# Patient Record
Sex: FEMALE | Race: WHITE
[De-identification: names, ages, dates, MRNs, and addresses within clinical notes are randomized per-mention and may not be internally consistent; named-entity substitution may affect disease eponyms.]

---

## 2019-03-30 ENCOUNTER — HOSPITAL ENCOUNTER (EMERGENCY)
Dept: HOSPITAL 53 - M ED | Age: 19
Discharge: HOME | End: 2019-03-30
Payer: COMMERCIAL

## 2019-03-30 VITALS — WEIGHT: 150.31 LBS | BODY MASS INDEX: 24.16 KG/M2 | HEIGHT: 66 IN

## 2019-03-30 VITALS — SYSTOLIC BLOOD PRESSURE: 104 MMHG | DIASTOLIC BLOOD PRESSURE: 56 MMHG

## 2019-03-30 DIAGNOSIS — R10.9: Primary | ICD-10-CM

## 2019-03-30 DIAGNOSIS — Z79.3: ICD-10-CM

## 2019-03-30 DIAGNOSIS — Z87.42: ICD-10-CM

## 2019-03-30 DIAGNOSIS — Z87.891: ICD-10-CM

## 2019-03-30 LAB
ALBUMIN SERPL BCG-MCNC: 3.8 GM/DL (ref 3.2–5.2)
ALT SERPL W P-5'-P-CCNC: 22 U/L (ref 12–78)
B-HCG SERPL QL: NEGATIVE
BASOPHILS # BLD AUTO: 0 10^3/UL (ref 0–0.2)
BASOPHILS NFR BLD AUTO: 0.4 % (ref 0–1)
BILIRUB CONJ SERPL-MCNC: < 0.1 MG/DL (ref 0–0.2)
BILIRUB SERPL-MCNC: 0.2 MG/DL (ref 0.2–1)
BUN SERPL-MCNC: 19 MG/DL (ref 7–18)
CALCIUM SERPL-MCNC: 8.8 MG/DL (ref 8.5–10.1)
CHLORIDE SERPL-SCNC: 108 MEQ/L (ref 98–107)
CO2 SERPL-SCNC: 25 MEQ/L (ref 21–32)
CREAT SERPL-MCNC: 0.76 MG/DL (ref 0.55–1.3)
EOSINOPHIL # BLD AUTO: 0.3 10^3/UL (ref 0–0.5)
EOSINOPHIL NFR BLD AUTO: 3.4 % (ref 0–3)
GFR SERPL CREATININE-BSD FRML MDRD: > 60 ML/MIN/{1.73_M2} (ref 60–?)
GLUCOSE SERPL-MCNC: 85 MG/DL (ref 70–100)
HCT VFR BLD AUTO: 34.1 % (ref 36–47)
HGB BLD-MCNC: 11.1 G/DL (ref 12–15.5)
LIPASE SERPL-CCNC: 80 U/L (ref 73–393)
LYMPHOCYTES # BLD AUTO: 3.2 10^3/UL (ref 1.5–6.5)
LYMPHOCYTES NFR BLD AUTO: 41.9 % (ref 24–44)
MCH RBC QN AUTO: 29.1 PG (ref 27–33)
MCHC RBC AUTO-ENTMCNC: 32.6 G/DL (ref 32–36.5)
MCV RBC AUTO: 89.3 FL (ref 80–96)
MONOCYTES # BLD AUTO: 0.8 10^3/UL (ref 0–0.8)
MONOCYTES NFR BLD AUTO: 10.3 % (ref 0–5)
NEUTROPHILS # BLD AUTO: 3.4 10^3/UL (ref 1.8–7.7)
NEUTROPHILS NFR BLD AUTO: 43.7 % (ref 36–66)
PLATELET # BLD AUTO: 172 10^3/UL (ref 150–450)
POTASSIUM SERPL-SCNC: 3.6 MEQ/L (ref 3.5–5.1)
PROT SERPL-MCNC: 6.4 GM/DL (ref 6.4–8.2)
RBC # BLD AUTO: 3.82 10^6/UL (ref 4–5.4)
SODIUM SERPL-SCNC: 142 MEQ/L (ref 136–145)
WBC # BLD AUTO: 7.7 10^3/UL (ref 4–10)

## 2019-03-30 PROCEDURE — 74177 CT ABD & PELVIS W/CONTRAST: CPT

## 2019-03-30 PROCEDURE — 96375 TX/PRO/DX INJ NEW DRUG ADDON: CPT

## 2019-03-30 PROCEDURE — 96374 THER/PROPH/DIAG INJ IV PUSH: CPT

## 2019-03-30 PROCEDURE — 99284 EMERGENCY DEPT VISIT MOD MDM: CPT

## 2019-03-30 PROCEDURE — 84703 CHORIONIC GONADOTROPIN ASSAY: CPT

## 2019-03-30 PROCEDURE — 85025 COMPLETE CBC W/AUTO DIFF WBC: CPT

## 2019-03-30 PROCEDURE — 83690 ASSAY OF LIPASE: CPT

## 2019-03-30 PROCEDURE — 81001 URINALYSIS AUTO W/SCOPE: CPT

## 2019-03-30 PROCEDURE — 76705 ECHO EXAM OF ABDOMEN: CPT

## 2019-03-30 PROCEDURE — 80048 BASIC METABOLIC PNL TOTAL CA: CPT

## 2019-03-30 PROCEDURE — 80076 HEPATIC FUNCTION PANEL: CPT

## 2019-03-30 PROCEDURE — 36415 COLL VENOUS BLD VENIPUNCTURE: CPT

## 2019-03-30 RX ADMIN — DIATRIZOATE MEGLUMINE AND DIATRIZOATE SODIUM SCH ML: 600; 100 SOLUTION ORAL; RECTAL at 02:05

## 2019-03-30 RX ADMIN — DIATRIZOATE MEGLUMINE AND DIATRIZOATE SODIUM SCH ML: 600; 100 SOLUTION ORAL; RECTAL at 02:40

## 2019-03-30 NOTE — REPVR
EXAM: 

 CT Abdomen and Pelvis With Contrast 



EXAM DATE/TIME: 

 3/30/2019 1:49 AM 



CLINICAL HISTORY: 

 19 years old, female; Pain; Abdominal pain; Localized; Right lower quadrant 

(rlq); Additional info: Periumbilical and rlq pain 



TECHNIQUE: 

 Imaging protocol: Axial computed tomography images of the abdomen and pelvis 

with intravenous contrast. 

  Coronal and sagittal reformatted images were created and reviewed. 

 Radiation optimization: All CT scans at this facility use at least one of 

these dose optimization techniques: automated exposure control; mA and/or kV 

adjustment per patient size (includes targeted exams where dose is matched to 

clinical indication); or iterative reconstruction. 

 Contrast material: iso 

 Contrast volume: 100 ml 

 Contrast route: ac 



COMPARISON: 

 No relevant prior studies available. 



FINDINGS: 

 Lower thorax:  The visualized portions of the lung bases are normal. 



ABDOMEN: 

 Liver:  There are no focal liver lesions present. 

 Gallbladder and bile ducts:  The gallbladder is normal with no stones. There 

is mild biliary ductal dilation. The common bile duct measures 7 mm in 

diameter. 

 Pancreas: The pancreas is normal with no ductal dilation.

 Spleen:  The spleen is normal. 

 Adrenals:  The adrenal glands are normal. 

 Kidneys and ureters:  The kidneys appear normal. There are bilateral 

extrarenal pelves but there is no significant dilation of the ureters. The 

nondilated distal ureters are difficult to trace but no stones are seen along 

the expected course of the ureters. 

 Stomach and bowel:  There is no dilation or thickening of the colon. The small 

bowel appears unremarkable. 

 Appendix:  A normal appendix is identified. 



PELVIS: 

 Bladder:  The bladder is unremarkable. No stones identified. 

 Reproductive:  The uterus is unremarkable. 



ABDOMEN and PELVIS: 

 Intraperitoneal space:  There is no free intraperitoneal air. There is no 

evidence of free intraperitoneal or pelvic fluid. 

 Bones/joints:  No suspicious osseous lesions. No acute fractures or 

dislocations. 

 Soft tissues: Unremarkable. 

 Vasculature:  The aorta is normal. No aneurysm. 

 Lymph nodes:  No lymphadenopathy is seen. 



IMPRESSION: 

1. Normal appearance of the appendix. 

2. Unremarkable appearance of the gallbladder but mild biliary ductal dilation 

is noted, with the common duct measuring 7 mm. Further evaluation can be 

performed with ultrasound if clinically indicated. 



Electronically signed by: Porsha Gallo On 03/30/2019  05:45:14 AM

## 2019-03-30 NOTE — REPVR
EXAM: 

 US Abdomen Limited, Right Upper Quadrant 



EXAM DATE/TIME: 

 3/30/2019 6:19 AM 



CLINICAL HISTORY: 

 19 years old, female; Pain; Abdominal pain; Epigastric; Additional info: ? 

Dilated biliary ducts, cbd 7mm on CT 



TECHNIQUE: 

 Imaging protocol: Real-time ultrasound of the abdomen with image 

documentation. Examination was focused on the right upper quadrant. 



COMPARISON: 

 CT ABD/PEL W/IV ORAL CONTRAS 3/30/2019 3:06 AM 



FINDINGS: 

 Liver:  The liver is normal in size and echogenicity. No focal lesions are 

identified. 

 Gallbladder:  No stones or sludge are seen in the gallbladder. No wall 

thickening or pericholecystic fluid are present. The gallbladder wall measures 

2 mm in thickness. The ultrasound technologist reports a negative Lafleur's 

sign. 

 Common bile duct:  There is no significant intrahepatic ductal dilation. The 

common bile duct measures 6 mm in diameter, which is mildly dilated for the 

patient's age. No stones are identified within the common bile duct. 

 Pancreas:  The pancreas appears normal with no ductal dilation. 

 Right kidney:  The right kidney is normal in size and echogenicity with no 

hydronephrosis or stones identified. The right kidney measures 11.0 cm in 

length. 



IMPRESSION: 

1. No cholelithiasis or signs of cholecystitis. 

2. The common bile duct is mildly dilated for the patient's age, measuring 6 

mm. No stones identified. 



Electronically signed by: Porsha Gallo On 03/30/2019  06:34:29 AM

## 2019-03-31 NOTE — ED PDOC
Post-Departure Follow-Up


[facility] faxed formal report of gbus for fu mlg Lundborg-Gray,Maja MD          Mar 31, 2019 09:30

## 2019-03-31 NOTE — ED PDOC
Post-Departure Follow-Up


ct abd/p faxed to [facility] too mlg Lundborg-Gray,Maja MD          Mar 31, 2019 09:30

## 2020-05-20 ENCOUNTER — HOSPITAL ENCOUNTER (EMERGENCY)
Dept: HOSPITAL 53 - M ED | Age: 20
Discharge: HOME | End: 2020-05-20
Payer: COMMERCIAL

## 2020-05-20 VITALS — BODY MASS INDEX: 23.1 KG/M2 | WEIGHT: 143.74 LBS | HEIGHT: 66 IN

## 2020-05-20 VITALS — DIASTOLIC BLOOD PRESSURE: 62 MMHG | SYSTOLIC BLOOD PRESSURE: 115 MMHG

## 2020-05-20 DIAGNOSIS — N61.1: Primary | ICD-10-CM

## 2020-05-20 DIAGNOSIS — N83.299: ICD-10-CM

## 2020-08-10 ENCOUNTER — HOSPITAL ENCOUNTER (EMERGENCY)
Dept: HOSPITAL 53 - M ED | Age: 20
Discharge: HOME | End: 2020-08-10
Payer: COMMERCIAL

## 2020-08-10 DIAGNOSIS — Z3A.18: ICD-10-CM

## 2020-08-10 DIAGNOSIS — Z79.899: ICD-10-CM

## 2020-08-10 DIAGNOSIS — O99.612: Primary | ICD-10-CM

## 2020-09-24 LAB
APPEARANCE UR: (no result)
BACTERIA UR QL AUTO: NEGATIVE
BASOPHILS # BLD AUTO: 0 10^3/UL (ref 0–0.2)
BASOPHILS NFR BLD AUTO: 0.2 % (ref 0–1)
BILIRUB UR QL STRIP.AUTO: NEGATIVE
EOSINOPHIL # BLD AUTO: 0.2 10^3/UL (ref 0–0.5)
EOSINOPHIL NFR BLD AUTO: 2.3 % (ref 0–3)
GLUCOSE UR QL STRIP.AUTO: NEGATIVE MG/DL
HCT VFR BLD AUTO: 35.7 % (ref 36–47)
HGB BLD-MCNC: 11.8 G/DL (ref 12–15.5)
HGB UR QL STRIP.AUTO: NEGATIVE
KETONES UR QL STRIP.AUTO: NEGATIVE MG/DL
LEUKOCYTE ESTERASE UR QL STRIP.AUTO: NEGATIVE
LYMPHOCYTES # BLD AUTO: 1.8 10^3/UL (ref 1.5–5)
LYMPHOCYTES NFR BLD AUTO: 20.5 % (ref 24–44)
MCH RBC QN AUTO: 29.4 PG (ref 27–33)
MCHC RBC AUTO-ENTMCNC: 33.1 G/DL (ref 32–36.5)
MCV RBC AUTO: 89 FL (ref 80–96)
MONOCYTES # BLD AUTO: 0.5 10^3/UL (ref 0–0.8)
MONOCYTES NFR BLD AUTO: 6 % (ref 0–5)
MUCOUS THREADS URNS QL MICRO: (no result)
NEUTROPHILS # BLD AUTO: 6.1 10^3/UL (ref 1.5–8.5)
NEUTROPHILS NFR BLD AUTO: 70.7 % (ref 36–66)
NITRITE UR QL STRIP.AUTO: NEGATIVE
PH UR STRIP.AUTO: 5 UNITS (ref 5–9)
PLATELET # BLD AUTO: 178 10^3/UL (ref 150–450)
PROT UR QL STRIP.AUTO: NEGATIVE MG/DL
RBC # BLD AUTO: 4.01 10^6/UL (ref 4–5.4)
RBC # UR AUTO: 1 /HPF (ref 0–3)
SP GR UR STRIP.AUTO: 1.02 (ref 1–1.03)
SQUAMOUS #/AREA URNS AUTO: 3 /HPF (ref 0–6)
UROBILINOGEN UR QL STRIP.AUTO: 0.2 MG/DL (ref 0–2)
WBC # BLD AUTO: 8.7 10^3/UL (ref 4–10)
WBC #/AREA URNS AUTO: 1 /HPF (ref 0–3)

## 2020-10-24 LAB
ALBUMIN SERPL BCG-MCNC: 3.2 GM/DL (ref 3.2–5.2)
ALT SERPL W P-5'-P-CCNC: 13 U/L (ref 12–78)
BILIRUB CONJ SERPL-MCNC: 0.1 MG/DL (ref 0–0.2)
BILIRUB SERPL-MCNC: 0.2 MG/DL (ref 0.2–1)
BUN SERPL-MCNC: 7 MG/DL (ref 7–18)
CALCIUM SERPL-MCNC: 8.8 MG/DL (ref 8.5–10.1)
CHLORIDE SERPL-SCNC: 108 MEQ/L (ref 98–107)
CO2 SERPL-SCNC: 24 MEQ/L (ref 21–32)
CREAT SERPL-MCNC: 0.62 MG/DL (ref 0.55–1.3)
GFR SERPL CREATININE-BSD FRML MDRD: > 60 ML/MIN/{1.73_M2} (ref 60–?)
GLUCOSE SERPL-MCNC: 75 MG/DL (ref 70–100)
LIPASE SERPL-CCNC: 50 U/L (ref 73–393)
POTASSIUM SERPL-SCNC: 4 MEQ/L (ref 3.5–5.1)
PROT SERPL-MCNC: 6.3 GM/DL (ref 6.4–8.2)
SODIUM SERPL-SCNC: 139 MEQ/L (ref 136–145)